# Patient Record
Sex: FEMALE | Race: WHITE
[De-identification: names, ages, dates, MRNs, and addresses within clinical notes are randomized per-mention and may not be internally consistent; named-entity substitution may affect disease eponyms.]

---

## 2021-08-18 ENCOUNTER — NURSE TRIAGE (OUTPATIENT)
Dept: OTHER | Facility: CLINIC | Age: 36
End: 2021-08-18

## 2021-08-18 NOTE — TELEPHONE ENCOUNTER
Reason for Disposition   Earache    Answer Assessment - Initial Assessment Questions  1. LOCATION: \"Where does it hurt? \"       Across forehead, in ears    2. ONSET: \"When did the sinus pain start? \"  (e.g., hours, days)       Saturday 8/14/21, went to [facility] Monday 8/16/21 and has been feeling progressively worse    3. SEVERITY: \"How bad is the pain? \"   (Scale 1-10; mild, moderate or severe)    - MILD (1-3): doesn't interfere with normal activities     - MODERATE (4-7): interferes with normal activities (e.g., work or school) or awakens from sleep    - SEVERE (8-10): excruciating pain and patient unable to do any normal activities         7/10 right now    4. RECURRENT SYMPTOM: \"Have you ever had sinus problems before? \" If so, ask: \"When was the last time? \" and \"What happened that time? \"       Has had sinus infections in past    5. NASAL CONGESTION: \"Is the nose blocked? \" If so, ask, \"Can you open it or must you breathe through the mouth? \"      Able to breathe out of nose    6. NASAL DISCHARGE: \"Do you have discharge from your nose? \" If so ask, \"What color? \"      Clear and yellow    7. FEVER: \"Do you have a fever? \" If so, ask: \"What is it, how was it measured, and when did it start? \"       Denies    8. OTHER SYMPTOMS: \"Do you have any other symptoms? \" (e.g., sore throat, cough, earache, difficulty breathing)      Cough, ear ache, sore throat    9. PREGNANCY: \"Is there any chance you are pregnant? \" \"When was your last menstrual period? \"      Denies. LMP - About 1 week ago    Protocols used: SINUS PAIN OR CONGESTION-ADULT-[place]    Brief description of triage: Patient experiencing sinus pain and congestion that has progressively worsened since Saturday 8/14/21    Triage indicates for patient to be seen today/ [facility]    Care advice provided, patient verbalizes understanding; denies any other questions or concerns; instructed to call back for any new or worsening symptoms.     This triage is a result of a call to [facility]. Please do not respond to the triage nurse through this encounter. Any subsequent communication should be directly with the patient.